# Patient Record
Sex: FEMALE | Race: WHITE | Employment: OTHER | URBAN - METROPOLITAN AREA
[De-identification: names, ages, dates, MRNs, and addresses within clinical notes are randomized per-mention and may not be internally consistent; named-entity substitution may affect disease eponyms.]

---

## 2017-04-03 RX ORDER — FAMOTIDINE 40 MG/1
TABLET, FILM COATED ORAL
Qty: 30 TABLET | Refills: 2 | Status: SHIPPED | OUTPATIENT
Start: 2017-04-03 | End: 2019-08-12

## 2017-08-28 ENCOUNTER — HOSPITAL ENCOUNTER (OUTPATIENT)
Dept: OTHER | Age: 72
Discharge: HOME | End: 2017-08-28
Attending: INTERNAL MEDICINE | Admitting: INTERNAL MEDICINE

## 2017-08-28 LAB
A/G RATIO: 1.6 (ref 1.1–2.2)
ALBUMIN SERPL-MCNC: 4.1 G/DL (ref 3.4–5)
ALP BLD-CCNC: 53 U/L (ref 40–129)
ALT SERPL-CCNC: 10 U/L (ref 10–40)
ANION GAP SERPL CALCULATED.3IONS-SCNC: 11 MMOL/L (ref 3–16)
AST SERPL-CCNC: 17 U/L (ref 15–37)
BASOPHILS ABSOLUTE: 0 K/UL (ref 0–0.2)
BASOPHILS RELATIVE PERCENT: 0.6 %
BILIRUB SERPL-MCNC: 0.8 MG/DL (ref 0–1)
BUN BLDV-MCNC: 12 MG/DL (ref 7–20)
CALCIUM SERPL-MCNC: 9.3 MG/DL (ref 8.3–10.6)
CHLORIDE BLD-SCNC: 103 MMOL/L (ref 99–110)
CHOLESTEROL, TOTAL: 195 MG/DL (ref 0–199)
CO2: 28 MMOL/L (ref 21–32)
CREAT SERPL-MCNC: 0.7 MG/DL (ref 0.6–1.2)
EOSINOPHILS ABSOLUTE: 0.1 K/UL (ref 0–0.6)
EOSINOPHILS RELATIVE PERCENT: 2 %
GFR AFRICAN AMERICAN: >60
GFR NON-AFRICAN AMERICAN: >60
GLOBULIN: 2.6 G/DL
GLUCOSE BLD-MCNC: 86 MG/DL (ref 70–99)
HCT VFR BLD CALC: 38.7 % (ref 36–48)
HDLC SERPL-MCNC: 71 MG/DL (ref 40–60)
HEMOGLOBIN: 12.9 G/DL (ref 12–16)
LDL CHOLESTEROL CALCULATED: 91 MG/DL
LYMPHOCYTES ABSOLUTE: 1.4 K/UL (ref 1–5.1)
LYMPHOCYTES RELATIVE PERCENT: 22.8 %
MCH RBC QN AUTO: 32.6 PG (ref 26–34)
MCHC RBC AUTO-ENTMCNC: 33.3 G/DL (ref 31–36)
MCV RBC AUTO: 98.1 FL (ref 80–100)
MONOCYTES ABSOLUTE: 0.6 K/UL (ref 0–1.3)
MONOCYTES RELATIVE PERCENT: 10.2 %
NEUTROPHILS ABSOLUTE: 3.9 K/UL (ref 1.7–7.7)
NEUTROPHILS RELATIVE PERCENT: 64.4 %
PDW BLD-RTO: 13.8 % (ref 12.4–15.4)
PLATELET # BLD: 340 K/UL (ref 135–450)
PMV BLD AUTO: 8.4 FL (ref 5–10.5)
POTASSIUM SERPL-SCNC: 4.6 MMOL/L (ref 3.5–5.1)
RBC # BLD: 3.95 M/UL (ref 4–5.2)
SODIUM BLD-SCNC: 142 MMOL/L (ref 136–145)
TOTAL PROTEIN: 6.7 G/DL (ref 6.4–8.2)
TRIGL SERPL-MCNC: 163 MG/DL (ref 0–150)
TSH REFLEX: 2.35 UIU/ML (ref 0.27–4.2)
VLDLC SERPL CALC-MCNC: 33 MG/DL
WBC # BLD: 6.1 K/UL (ref 4–11)

## 2018-04-07 ENCOUNTER — HOSPITAL ENCOUNTER (OUTPATIENT)
Dept: GENERAL RADIOLOGY | Age: 73
Discharge: HOME | End: 2018-04-07
Attending: INTERNAL MEDICINE | Admitting: INTERNAL MEDICINE

## 2018-04-07 DIAGNOSIS — R10.13 ABDOMINAL PAIN, EPIGASTRIC: ICD-10-CM

## 2018-04-07 DIAGNOSIS — R10.13 EPIGASTRIC PAIN: ICD-10-CM

## 2018-04-12 ENCOUNTER — HOSPITAL ENCOUNTER (OUTPATIENT)
Dept: OTHER | Age: 73
Discharge: HOME | End: 2018-04-12

## 2018-04-12 DIAGNOSIS — R05.9 COUGH: ICD-10-CM

## 2019-04-24 ENCOUNTER — HOSPITAL ENCOUNTER (OUTPATIENT)
Dept: GENERAL RADIOLOGY | Age: 74
Discharge: HOME | End: 2019-04-24
Payer: MEDICARE

## 2019-04-24 DIAGNOSIS — Z78.0 POSTMENOPAUSAL: ICD-10-CM

## 2019-04-24 PROCEDURE — 77080 DXA BONE DENSITY AXIAL: CPT

## 2019-05-23 ENCOUNTER — HOSPITAL ENCOUNTER (OUTPATIENT)
Dept: PHYSICAL THERAPY | Age: 74
Setting detail: THERAPIES SERIES
Discharge: HOME | End: 2019-05-23
Payer: MEDICARE

## 2019-05-23 PROCEDURE — 97530 THERAPEUTIC ACTIVITIES: CPT

## 2019-05-23 PROCEDURE — 97112 NEUROMUSCULAR REEDUCATION: CPT

## 2019-05-23 PROCEDURE — 97161 PT EVAL LOW COMPLEX 20 MIN: CPT

## 2019-05-23 PROCEDURE — 97110 THERAPEUTIC EXERCISES: CPT

## 2019-05-23 NOTE — PROGRESS NOTES
Physical Therapy  Initial Assessment  Date: 2019  Patient Name: Mayte Concepcion  MRN: [id number]  : 1945     Treatment Diagnosis: decreased pelvic floor and core strength    Restrictions    Outpatient fall risk assessment completed asking screening question if patient has fallen in the past 30 days:  [x] Yes  [] No    Based on screen for falls, patient demonstrates fall risk:  [] Yes  [x] No    Interventions based on fall risk status:  Updated Problem List within Medical History  [] Yes   [x] N/A    Asked family to assist with increased observation of the patient  [] Yes   [] N/A    Patient kept in visible area when not closely supervised by therapist  [] Yes   [x] N/A    Repeatedly reinforce activity limits and safety needs with patient/family  [] Yes   [x] N/A    Increase frequency of rounding/monitoring patient  [] Yes   [x] N/A        Subjective   General  Chart Reviewed: Yes  Diagnosis: incontinence/ pelvic floor weakness  Follows Commands: Within Functional Limits  General Comment  Comments: 6 children  PT Visit Information  PT Insurance Information: medicare- Aet  Total # of Visits Approved: 12  Subjective  Subjective: Pt reports she had accidents when in [facility]. Went to her MD and he reccommended core strengthening. Went to her GYN and he said pelvic floor therapy. States she doesn't have any issues with diarrahea anymore with dietary changes. Taking probiotics also. Has had issues with fully empying bladder and infections in past. PLOF:  I with all ADL's. Spend 4 months in [facility] in the winter.    Pain Screening  Patient Currently in Pain: Yes  Vital Signs  Patient Currently in Pain: Yes    Vision/Hearing  Vision  Vision: Within Functional Limits  Hearing  Hearing: Within functional limits    Orientation  Orientation  Overall Orientation Status: Within Normal Limits    Social/Functional History  Social/Functional History  Lives With: Spouse  Type of Home: House  Home Layout: Two level  ADL Assistance: Independent  Homemaking Assistance: Independent  Ambulation Assistance: Independent  Transfer Assistance: Independent  Active : Yes  Occupation: Retired  Type of occupation: clean houses  Leisure & Hobbies: children's activities. Objective     Observation/Palpation  Posture: Good    AROM RLE (degrees)  RLE AROM: WNL  AROM LLE (degrees)  LLE AROM : WNL    Strength RLE  Strength RLE: Exception  R Hip Flexion: 4+/5  R Hip Extension: 5/5  R Hip ABduction: 4/5  R Hip ADduction: 4/5  R Hip Internal Rotation: 4/5  R Hip External Rotation: 4/5  Strength LLE  Strength LLE: Exception  L Hip Flexion: 4+/5  L Hip Extension: 5/5  L Hip ABduction: 4/5  L Hip ADduction: 4/5  L Hip Internal Rotation: 4/5  L Hip External Rotation: 4/5  Additional Measures  Flexibility: L piriformis tightnes    Assessment   Conditions Requiring Skilled Therapeutic Intervention  Body structures, Functions, Activity limitations: Decreased functional mobility ; Decreased high-level IADLs;Decreased ADL status; Decreased endurance;Decreased strength  Assessment: Pt presents with decreased pelvic floor and core strength.   Pt will benefit from skilled physical therapy to return to PLOF  Treatment Diagnosis: decreased pelvic floor and core strength  Prognosis: Good  Decision Making: Low Complexity  REQUIRES PT FOLLOW UP: Yes  Activity Tolerance  Activity Tolerance: Patient Tolerated treatment well      Plan   Plan  Times per week: 2 x a week  Plan weeks: 6 weeks  Specific instructions for Next Treatment: biofeedbacck further assessment of pelvic floor mm  Current Treatment Recommendations: Strengthening, Manual Therapy - Joint Manipulation, Neuromuscular Re-education, Functional Mobility Training, Endurance Training, Manual Therapy - Soft Tissue Mobilization, Home Exercise Program      Goals  Long term goals  Time Frame for Long term goals : 6 weeks  Long term goal 1: Pt will be I in HEP to return to PLOF  Long term goal 2: Pt will

## 2019-05-23 NOTE — FLOWSHEET NOTE
Outpatient Physical Therapy     Phone: [phone] Fax: [phone]     To:        Patient: Lucy Saldana   : 1945   MRN: [id number]  Evaluation Date: 2019      Diagnosis Information:  · Diagnosis: incontinence/ pelvic floor weakness   · Treatment Diagnosis: decreased pelvic floor and core strength     Physical Therapy Certification/Re-Certification Form  Dear Mireille Burks,  The following patient has been evaluated for physical therapy services. Please review the attached evaluation and/or summary of the patient's plan of care, and verify that you agree therapy should continue by signing the attached document and sending it back to our office. Plan of Care/Treatment to date:  [x] Therapeutic Exercise      [] Modalities:  [x] Therapeutic Activity        [] Ultrasound    [] Gait Training        [] Cervical Traction   [x] Neuromuscular Re-education      [] Cold/hotpack    [x] Instruction in HEP        [] Lumbar Traction  [] Manual Therapy        [] Electrical Stimulation            [] Aquatic Therapy        [] Iontophoresis        ? [] Lymphedema management  [x] Women's Health     Other:  [] Vestibular Rehab        [x]  biofeedback  []  Needed     Frequency/Duration:  # Days per week: [] 1 day # Weeks: [] 1 week [] 5 weeks     [x] 2 days? [] 2 weeks [x] 6 weeks     [] 3 days   [] 3 weeks [] 7 weeks     [] 4 days   [] 4 weeks [] 8 weeks    Rehab Potential: [] Excellent [x] Good [] Fair  [] Poor     Electronically signed by:  Handy Adame PT    If you have any questions or concerns, please don't hesitate to call.   Thank you for your referral.      Physician Signature:________________________________Date:__________________  By signing above, therapists plan is approved by physician

## 2019-05-23 NOTE — FLOWSHEET NOTE
Physical Therapy Daily Treatment Note  Date:  2019    Patient Name:  Audi Lakhani    :  1945  MRN: [id number]  Restrictions/Precautions:    Medical/Treatment Diagnosis Information:   Diagnosis: incontinence/ pelvic floor weakness  Treatment Diagnosis: decreased pelvic floor and core strength    Tracking Information:  Physician Information Referring Practitioner: Dr. Thiago Rowland of Care Sent Date:  Signed Received:    Visit Count / Total Visits      Insurance Approved Visits  /  Approved Dates:     Insurance Information PT Insurance Information: medicare- [employer]     Progress Note/G-codes   [x]  Yes  []  No Next Due:      Pain level: 0/10     Subjective:  See eval    Objective:   Observation:   Test measurements:      Exercises:  Exercise/Equipment Resistance/Repetitions Other comments   On ball  3'/10' hold/relax x 10  Quick flicks x 10 every 5'                                                                        Other Therapeutic Activities: : pt taught pelvic floor anatomy, normal bladder behavior and bladder irritants    Home Exercise Program:  19 Patient was educated on home exercise program including distrubution of handout describing exercises, sets, repetitions, frequency and intensity.  Exercises/activities include: SLR in all dir x 10, clam shell x 10 hip circles x 10, pelvic floor contraction  With hold/relax and quick flicks     Manual Treatments:      Modalities:      Timed Code Treatment Minutes:  39    Total Treatment Minutes:  53    Treatment/Activity Tolerance:  [x] Patient tolerated treatment well [] Patient limited by fatigue  [] Patient limited by pain  [] Patient limited by other medical complications  [] Other:     Prognosis: [x] Good [] Fair  [] Poor    Patient Requires Follow-up: [x] Yes  [] No    Plan:   [] Continue per plan of care [] Alter current plan (see comments)  [x] Plan of care initiated [] Hold pending MD visit [] Discharge  Plan for Next Session:  Biofeedback, core/hip strengthening    Electronically signed by:  Negro Lara PT

## 2019-06-15 ENCOUNTER — HOSPITAL ENCOUNTER (EMERGENCY)
Age: 74
Discharge: HOME | End: 2019-06-15
Payer: MEDICARE

## 2019-06-15 ENCOUNTER — APPOINTMENT (OUTPATIENT)
Dept: CT IMAGING | Age: 74
End: 2019-06-15
Payer: MEDICARE

## 2019-06-15 VITALS
WEIGHT: 116 LBS | RESPIRATION RATE: 14 BRPM | SYSTOLIC BLOOD PRESSURE: 124 MMHG | BODY MASS INDEX: 19.91 KG/M2 | DIASTOLIC BLOOD PRESSURE: 63 MMHG | OXYGEN SATURATION: 98 % | TEMPERATURE: 98.8 F | HEART RATE: 78 BPM

## 2019-06-15 DIAGNOSIS — R33.8 ACUTE URINARY RETENTION: Primary | ICD-10-CM

## 2019-06-15 DIAGNOSIS — N30.01 ACUTE CYSTITIS WITH HEMATURIA: ICD-10-CM

## 2019-06-15 LAB
ANION GAP SERPL CALCULATED.3IONS-SCNC: 11 MMOL/L (ref 3–16)
BASOPHILS ABSOLUTE: 0 K/UL (ref 0–0.2)
BASOPHILS RELATIVE PERCENT: 0.2 %
BILIRUBIN URINE: NEGATIVE
BLOOD, URINE: ABNORMAL
BUN BLDV-MCNC: 19 MG/DL (ref 7–20)
CALCIUM SERPL-MCNC: 10.1 MG/DL (ref 8.3–10.6)
CHLORIDE BLD-SCNC: 97 MMOL/L (ref 99–110)
CLARITY: ABNORMAL
CO2: 26 MMOL/L (ref 21–32)
COLOR: ABNORMAL
CREAT SERPL-MCNC: 0.7 MG/DL (ref 0.6–1.2)
EOSINOPHILS ABSOLUTE: 0 K/UL (ref 0–0.6)
EOSINOPHILS RELATIVE PERCENT: 0.1 %
GFR AFRICAN AMERICAN: >60
GFR NON-AFRICAN AMERICAN: >60
GLUCOSE BLD-MCNC: 96 MG/DL (ref 70–99)
GLUCOSE URINE: NEGATIVE MG/DL
HCT VFR BLD CALC: 39.7 % (ref 36–48)
HEMOGLOBIN: 13.3 G/DL (ref 12–16)
KETONES, URINE: NEGATIVE MG/DL
LEUKOCYTE ESTERASE, URINE: ABNORMAL
LYMPHOCYTES ABSOLUTE: 0.9 K/UL (ref 1–5.1)
LYMPHOCYTES RELATIVE PERCENT: 6 %
MCH RBC QN AUTO: 32.4 PG (ref 26–34)
MCHC RBC AUTO-ENTMCNC: 33.5 G/DL (ref 31–36)
MCV RBC AUTO: 96.6 FL (ref 80–100)
MICROSCOPIC EXAMINATION: YES
MONOCYTES ABSOLUTE: 1.1 K/UL (ref 0–1.3)
MONOCYTES RELATIVE PERCENT: 7.4 %
NEUTROPHILS ABSOLUTE: 12.7 K/UL (ref 1.7–7.7)
NEUTROPHILS RELATIVE PERCENT: 86.3 %
NITRITE, URINE: NEGATIVE
PDW BLD-RTO: 13.4 % (ref 12.4–15.4)
PH UA: 6.5 (ref 5–8)
PLATELET # BLD: 306 K/UL (ref 135–450)
PMV BLD AUTO: 8.2 FL (ref 5–10.5)
POTASSIUM REFLEX MAGNESIUM: 4.1 MMOL/L (ref 3.5–5.1)
PROTEIN UA: >=300 MG/DL
RBC # BLD: 4.11 M/UL (ref 4–5.2)
RBC UA: NORMAL /HPF (ref 0–2)
SODIUM BLD-SCNC: 134 MMOL/L (ref 136–145)
SPECIFIC GRAVITY UA: 1.02 (ref 1–1.03)
URINE REFLEX TO CULTURE: YES
URINE TYPE: ABNORMAL
UROBILINOGEN, URINE: 0.2 E.U./DL
WBC # BLD: 14.7 K/UL (ref 4–11)
WBC UA: NORMAL /HPF (ref 0–5)

## 2019-06-15 PROCEDURE — 87086 URINE CULTURE/COLONY COUNT: CPT

## 2019-06-15 PROCEDURE — 81001 URINALYSIS AUTO W/SCOPE: CPT

## 2019-06-15 PROCEDURE — 74176 CT ABD & PELVIS W/O CONTRAST: CPT

## 2019-06-15 PROCEDURE — 85025 COMPLETE CBC W/AUTO DIFF WBC: CPT

## 2019-06-15 PROCEDURE — 99284 EMERGENCY DEPT VISIT MOD MDM: CPT

## 2019-06-15 PROCEDURE — 51798 US URINE CAPACITY MEASURE: CPT

## 2019-06-15 PROCEDURE — 2580000003 HC RX 258: Performed by: NURSE PRACTITIONER

## 2019-06-15 PROCEDURE — 6370000000 HC RX 637 (ALT 250 FOR IP): Performed by: NURSE PRACTITIONER

## 2019-06-15 PROCEDURE — 80048 BASIC METABOLIC PNL TOTAL CA: CPT

## 2019-06-15 PROCEDURE — 96360 HYDRATION IV INFUSION INIT: CPT

## 2019-06-15 PROCEDURE — 51701 INSERT BLADDER CATHETER: CPT

## 2019-06-15 RX ORDER — 0.9 % SODIUM CHLORIDE 0.9 %
1000 INTRAVENOUS SOLUTION INTRAVENOUS ONCE
Status: COMPLETED | OUTPATIENT
Start: 2019-06-15 | End: 2019-06-15

## 2019-06-15 RX ORDER — ONDANSETRON 4 MG/1
8 TABLET, ORALLY DISINTEGRATING ORAL ONCE
Status: COMPLETED | OUTPATIENT
Start: 2019-06-15 | End: 2019-06-15

## 2019-06-15 RX ORDER — CEFUROXIME AXETIL 250 MG/1
250 TABLET ORAL 2 TIMES DAILY
Qty: 14 TABLET | Refills: 0 | Status: SHIPPED | OUTPATIENT
Start: 2019-06-15 | End: 2019-06-22

## 2019-06-15 RX ADMIN — ONDANSETRON 8 MG: 4 TABLET, ORALLY DISINTEGRATING ORAL at 14:18

## 2019-06-15 RX ADMIN — SODIUM CHLORIDE 1000 ML: 9 INJECTION, SOLUTION INTRAVENOUS at 15:58

## 2019-06-15 ASSESSMENT — ENCOUNTER SYMPTOMS
CONSTIPATION: 0
ABDOMINAL PAIN: 0
NAUSEA: 1
DIARRHEA: 1
RHINORRHEA: 0
SORE THROAT: 0
BLOOD IN STOOL: 0
SHORTNESS OF BREATH: 0
VOMITING: 0

## 2019-06-15 ASSESSMENT — PAIN SCALES - GENERAL: PAINLEVEL_OUTOF10: 4

## 2019-06-15 ASSESSMENT — PAIN DESCRIPTION - PAIN TYPE: TYPE: ACUTE PAIN

## 2019-06-15 NOTE — ED PROVIDER NOTES
[facility]        Pt Name: Porfirio Danielle  MRN: [id number]  [place] 1945  Date of evaluation: 6/15/2019  Provider: DEAN Goode CNP  PCP: Larry Almeida MD    This patient was not seen and evaluated by the attending physician    [address] [place] [address]       Chief Complaint   Patient presents with    Urinary Retention     Pt to ER with urinary retention since this morning, states bilateral lowert back pain, states started yesterday but worse today. pt states was strainging this morning to urinate, causes herself to had episode of emesis. Pt states chills. states diarrhea before coming to ER. HISTORY OF PRESENT ILLNESS   (Location/Symptom, Timing/Onset,Context/Setting, Quality, Duration, Modifying Factors, Severity)  Note limiting factors. Porfirio Danielle is a 68 y.o. female who presents the ER with concern for acute urinary retention. She reports that she does see a urologist for this. She has not had any issues in over 8 months. She reports that she started having some pain with urination last night. This morning she is been unable to void. She had to catheterize herself for urine. She feels pressure like she needs to urinate but is unable to do so. She reports associated nausea without vomiting as well as diarrhea. She denies fever, rash, headaches, dizziness, chest pain, shortness of breath, cough, congestion, abdominal pain, vomiting, constipation, or blood in the stool. One friend/family member at bedside. Nursing Notes triage note reviewed and agreed with or any disagreements were addressed  in the HPI. REVIEW OF SYSTEMS    (2-9 systems for level 4, 10 or more for level 5)     Review of Systems   Constitutional: Negative for chills and fever. HENT: Negative for postnasal drip, rhinorrhea and sore throat. Eyes: Negative for visual disturbance.    Respiratory: Negative for shortness of breath. Cardiovascular: Negative for chest pain. Gastrointestinal: Positive for diarrhea and nausea. Negative for abdominal pain, blood in stool, constipation and vomiting. Genitourinary: Positive for difficulty urinating and dysuria. Negative for flank pain and hematuria. Skin: Negative for rash. Neurological: Negative for weakness and headaches. All other systems reviewed and are negative. Positives and Pertinent negatives as per HPI. Except as noted above in the ROS, all other systems were reviewed and negative. PAST MEDICAL HISTORY     Past Medical History:   Diagnosis Date    Anxiety     Depression     GERD (gastroesophageal reflux disease)     Pap smear 04/12/2011    Psychiatric problem     Sjogren syndrome     pt denies having this disease         SURGICAL HISTORY       Past Surgical History:   Procedure Laterality Date    BREAST ENHANCEMENT SURGERY      EYE SURGERY      mayank catarcts    LEEP      VARICOSE VEIN SURGERY Right 4/1/2016    lesser saphenous vein radiofrequency ablation;ligation & division of saphenous saphopopiliteal junction;stab phlebectomy    VASCULAR SURGERY Right 9.5.06    L/D of SFJ; RFA, L/D SPJ,Stab Phlebectomy         CURRENT MEDICATIONS       Previous Medications    ESCITALOPRAM OXALATE (LEXAPRO PO)    Take by mouth    FAMOTIDINE (PEPCID) 40 MG TABLET    TAKE ONE TABLET BY MOUTH EVERY EVENING    PANTOTHENIC ACID PO    Take by mouth    TRAZODONE HCL PO    Take by mouth         ALLERGIES     Patient has no known allergies.     FAMILY HISTORY       Family History   Problem Relation Age of Onset    Stroke Mother     Heart Disease Father           SOCIAL HISTORY       Social History     Socioeconomic History    Marital status: Single     Spouse name: None    Number of children: None    Years of education: None    Highest education level: None   Occupational History    None   Social Needs    Financial resource strain: None    Food insecurity: Worry: None     Inability: None    Transportation needs:     Medical: None     Non-medical: None   Tobacco Use    Smoking status: Never Smoker    Smokeless tobacco: Never Used   Substance and Sexual Activity    Alcohol use: Yes     Comment: occ    Drug use: No    Sexual activity: None   Lifestyle    Physical activity:     Days per week: None     Minutes per session: None    Stress: None   Relationships    Social connections:     Talks on phone: None     Gets together: None     Attends Judaism service: None     Active member of club or organization: None     Attends meetings of clubs or organizations: None     Relationship status: None    Intimate partner violence:     Fear of current or ex partner: None     Emotionally abused: None     Physically abused: None     Forced sexual activity: None   Other Topics Concern    None   Social History Narrative    None       SCREENINGS             PHYSICAL EXAM  (up to 7 for level 4, 8 or more for level 5)     ED Triage Vitals [06/15/19 1333]   BP Temp Temp Source Pulse Resp SpO2 Height Weight   (!) 144/58 98.8 °F (37.1 °C) Oral 81 18 100 % -- 116 lb (52.6 kg)       Physical Exam   Constitutional: She is oriented to person, place, and time. She appears well-developed and well-nourished. No distress. HENT:   Head: Normocephalic and atraumatic. Eyes: Right eye exhibits no discharge. Left eye exhibits no discharge. No scleral icterus. Neck: Normal range of motion. Neck supple. Cardiovascular: Normal rate, regular rhythm, normal heart sounds and intact distal pulses. Exam reveals no gallop and no friction rub. No murmur heard. Pulmonary/Chest: Effort normal and breath sounds normal. No stridor. No respiratory distress. She has no wheezes. She has no rales. She exhibits no tenderness. Abdominal: Soft. Bowel sounds are normal. She exhibits no distension and no mass. There is no tenderness. There is no rebound and no guarding.    Musculoskeletal: Normal range of motion. She exhibits no edema or tenderness. Neurological: She is alert and oriented to person, place, and time. Coordination normal.   Skin: Skin is warm and dry. She is not diaphoretic. No pallor. Psychiatric: She has a normal mood and affect. Her behavior is normal.   Nursing note and vitals reviewed. DIAGNOSTIC RESULTS   LABS:    Labs Reviewed   URINE RT REFLEX TO CULTURE - Abnormal; Notable for the following components:       Result Value    Color, UA RED (*)     Clarity, UA TURBID (*)     Blood, Urine LARGE (*)     Protein, UA >=300 (*)     Leukocyte Esterase, Urine SMALL (*)     All other components within normal limits    Narrative:     Performed at:  [facility] [address],  [place]   Phone [phone]   BASIC METABOLIC PANEL W/ REFLEX TO MG FOR LOW K - Abnormal; Notable for the following components:    Sodium 134 (*)     Chloride 97 (*)     All other components within normal limits    Narrative:     Performed at:  [facility] [address] [place]   Phone [phone]   CBC WITH AUTO DIFFERENTIAL - Abnormal; Notable for the following components:    WBC 14.7 (*)     Neutrophils # 12.7 (*)     Lymphocytes # 0.9 (*)     All other components within normal limits    Narrative:     Performed at:  [facility] [address]   Phone [phone]   URINE CULTURE   MICROSCOPIC URINALYSIS    Narrative:     Performed at:  [facility] [address],  [place]   Phone [phone]       All other labs werewithin normal range or not returned as of this dictation. EKG: All EKG's are interpreted by the Emergency Department Physician who either signs or Co-signs this chart in the absence of acardiologist.  Please see their note for interpretation of EKG.       RADIOLOGY:   Interpretation per the

## 2019-06-15 NOTE — ED NOTES
Pt. Bladder scanned 4 times, best volume reading 143 ml. MOHINDER Hyde CNP informed, V.O. To straight cath.      Nita Anderson RN  06/15/19 [id number]

## 2019-06-17 LAB — URINE CULTURE, ROUTINE: NORMAL

## 2019-07-10 ENCOUNTER — OFFICE VISIT (OUTPATIENT)
Dept: VASCULAR SURGERY | Age: 74
End: 2019-07-10
Payer: MEDICARE

## 2019-07-10 VITALS
BODY MASS INDEX: 20.32 KG/M2 | DIASTOLIC BLOOD PRESSURE: 72 MMHG | HEART RATE: 66 BPM | HEIGHT: 64 IN | WEIGHT: 119 LBS | SYSTOLIC BLOOD PRESSURE: 122 MMHG

## 2019-07-10 DIAGNOSIS — I83.891 SYMPTOMATIC VARICOSE VEINS OF RIGHT LOWER EXTREMITY: ICD-10-CM

## 2019-07-10 DIAGNOSIS — M79.604 PAIN OF RIGHT LOWER EXTREMITY: Primary | ICD-10-CM

## 2019-07-10 PROCEDURE — 99214 OFFICE O/P EST MOD 30 MIN: CPT | Performed by: SURGERY

## 2019-07-10 RX ORDER — OMEPRAZOLE 20 MG/1
20 CAPSULE, DELAYED RELEASE ORAL DAILY
COMMUNITY

## 2019-07-10 ASSESSMENT — ENCOUNTER SYMPTOMS
RESPIRATORY NEGATIVE: 1
VOICE CHANGE: 1
SINUS PRESSURE: 1
SINUS PAIN: 1
DIARRHEA: 1

## 2019-08-12 ENCOUNTER — OFFICE VISIT (OUTPATIENT)
Dept: DERMATOLOGY | Age: 74
End: 2019-08-12
Payer: MEDICARE

## 2019-08-12 DIAGNOSIS — L72.0 MILIA: ICD-10-CM

## 2019-08-12 DIAGNOSIS — D22.9 MULTIPLE NEVI: ICD-10-CM

## 2019-08-12 DIAGNOSIS — D23.9 SYRINGOMA: ICD-10-CM

## 2019-08-12 DIAGNOSIS — L57.0 AK (ACTINIC KERATOSIS): ICD-10-CM

## 2019-08-12 DIAGNOSIS — B07.8 OTHER VIRAL WARTS: ICD-10-CM

## 2019-08-12 DIAGNOSIS — Z85.828 HISTORY OF NONMELANOMA SKIN CANCER: Primary | ICD-10-CM

## 2019-08-12 DIAGNOSIS — L60.8 NAIL DISCOLORATION: ICD-10-CM

## 2019-08-12 DIAGNOSIS — D48.5 NEOPLASM OF UNCERTAIN BEHAVIOR OF SKIN: ICD-10-CM

## 2019-08-12 PROCEDURE — 17110 DESTRUCTION B9 LES UP TO 14: CPT | Performed by: DERMATOLOGY

## 2019-08-12 PROCEDURE — 11102 TANGNTL BX SKIN SINGLE LES: CPT | Performed by: DERMATOLOGY

## 2019-08-12 PROCEDURE — 99214 OFFICE O/P EST MOD 30 MIN: CPT | Performed by: DERMATOLOGY

## 2019-08-12 PROCEDURE — 17000 DESTRUCT PREMALG LESION: CPT | Performed by: DERMATOLOGY

## 2019-08-12 NOTE — PROGRESS NOTES
[facility]  Oval MD Vicky  [phone]      Melodie Nunez  1945    76 y.o. female     Date of Visit: 8/12/2019    Last seen: 9-2016    Chief Complaint: skin cancer, moles  Chief Complaint   Patient presents with    Skin Lesion     FSE   - mole check       HX:     History of Present Illness:    1. Here for skin cancer. 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 Luigi Feliz. No probs with this site. 2. S/p bx of a lesion under the L eye in 2016 - read as syringoma - no probs since. 3. Here for evaluation of multiple asx pigmented lesions on the trunk and extremities, present for many years; no change in size/shape/color of any lesions; no bleeding lesions. 4. She has multiple asx papules on the cheeks. These are asx but she might like cosmetic treatment for them. 5. She has a rough lesion on the R hand. Asx.     6. She has a persistent wart on the R index. Occasionally tender. 7. She has a concerning rough lesion on the upper back x several mos. 8. C/o yellow-orange discoloration of fingernails x 1-2 years. Asx. Polishes intermittently but not for the past several mos. Hx of NMSC - SCC on the R calf ~ 2013 by Luigi Feliz. She has had botox/ the past but Dr. Marla Melvin office. Review of Systems:  Gen: Feels well, good sense of health. No F/C. Skin: No changing moles or lesions. No new rashes. Past Medical History, Family History, Surgical History, Medications and Allergies reviewed.     Outpatient Medications Marked as Taking for the 8/12/19 encounter (Office Visit) with Chery Roman MD   Medication Sig Dispense Refill    omeprazole (PRILOSEC) 20 MG delayed release capsule Take 20 mg by mouth daily      Escitalopram Oxalate (LEXAPRO PO) Take by mouth      TRAZODONE HCL PO Take by mouth       Allergies   Allergen Reactions    Seasonal        Past Medical History:   Diagnosis Date    Anxiety

## 2019-08-15 LAB — DERMATOLOGY PATHOLOGY REPORT: NORMAL

## 2019-09-03 ENCOUNTER — OFFICE VISIT (OUTPATIENT)
Dept: DERMATOLOGY | Age: 74
End: 2019-09-03
Payer: MEDICARE

## 2019-09-03 DIAGNOSIS — B07.8 OTHER VIRAL WARTS: ICD-10-CM

## 2019-09-03 DIAGNOSIS — L57.0 AK (ACTINIC KERATOSIS): Primary | ICD-10-CM

## 2019-09-03 DIAGNOSIS — L82.1 SEBORRHEIC KERATOSIS: ICD-10-CM

## 2019-09-03 PROCEDURE — 17110 DESTRUCTION B9 LES UP TO 14: CPT | Performed by: DERMATOLOGY

## 2019-09-03 PROCEDURE — 17000 DESTRUCT PREMALG LESION: CPT | Performed by: DERMATOLOGY

## 2019-09-03 PROCEDURE — 99212 OFFICE O/P EST SF 10 MIN: CPT | Performed by: DERMATOLOGY

## 2019-09-03 NOTE — PROGRESS NOTES
[facility]  Crystal Ron MD  [phone]      [place]  1945    76 y.o. female     Date of Visit: 9/3/2019    Last seen: 8-2019    Chief Complaint: AK  Chief Complaint   Patient presents with    Follow-up     prev bx on back-AK curettage vs LN2     History of Present Illness:    1. Here for trx s/p bx of lesion on the upper back - read as hypertrophic actinic keratosis, diffusely transected at   deep margin. No probs since last seen. 2. She has a papule on the L upper arm that she noticed after her last visit. Asx.     3. She has a wart on the R index, s/p LN2 at last visit - smaller but still present. Had FSE at last visit 8-2019. 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 Cameron Mei. S/p bx of a lesion under the L eye in 2016 - read as syringoma - no probs since. She has had botox/ the past but Dr. Jon Birmingham office. Review of Systems:  Gen: Feels well, good sense of health. Past Medical History, Family History, Surgical History, Medications and Allergies reviewed.     Outpatient Medications Marked as Taking for the 9/3/19 encounter (Office Visit) with Elvie Roberto MD   Medication Sig Dispense Refill    tretinoin (RETIN-A) 0.025 % cream Apply a pea sized amount to the face QHS 20 g 4    omeprazole (PRILOSEC) 20 MG delayed release capsule Take 20 mg by mouth daily      PANTOTHENIC ACID PO Take by mouth      Escitalopram Oxalate (LEXAPRO PO) Take by mouth      TRAZODONE HCL PO Take by mouth       Allergies   Allergen Reactions    Seasonal        Past Medical History:   Diagnosis Date    Anxiety     Depression     GERD (gastroesophageal reflux disease)     Pap smear 04/12/2011    Psychiatric problem     Sjogren syndrome     pt denies having this disease     Past Surgical History:   Procedure Laterality Date    BREAST ENHANCEMENT SURGERY      EYE SURGERY      mayank catarcts    LEEP      VARICOSE VEIN SURGERY Right 4/1/2016    lesser saphenous vein radiofrequency ablation;ligation & division of saphenous saphopopiliteal junction;stab phlebectomy    VASCULAR SURGERY Right 9.5.06    L/D of SFJ; RFA, L/D SPJ,Stab Phlebectomy       Physical Examination     Gen, well-appearing  The following were examined and determined to be normal: Psych/Neuro, LUE    The following were examined and determined to be abnormal: back, finger. R index with hyperkeratotic 3 mm papule  R upper back at neck with erythematous roughly scaled macule   L upper outer arm with stuck-on dull-surfaced tan papule             Assessment and Plan     1. HAK - R upper back at neck  - lesion(s) treated with liquid nitrogen x 2 cycles. Patient educated on risk of blister, hypopigmentation/scar and wound care. 2. SK - L upper arm - reassured  - treated with LN2 - no extra charge    3. Wart - R index  - lesion(s) treated with liquid nitrogen x 2 cycles. Patient educated on risk of blister, hypopigmentation/scar and wound care.

## 2021-10-21 ENCOUNTER — OFFICE VISIT (OUTPATIENT)
Dept: DERMATOLOGY | Age: 76
End: 2021-10-21
Payer: MEDICARE

## 2021-10-21 VITALS — TEMPERATURE: 98.6 F

## 2021-10-21 DIAGNOSIS — Z85.828 HISTORY OF NONMELANOMA SKIN CANCER: Primary | ICD-10-CM

## 2021-10-21 DIAGNOSIS — L72.0 MILIA: ICD-10-CM

## 2021-10-21 DIAGNOSIS — D22.9 MULTIPLE NEVI: ICD-10-CM

## 2021-10-21 DIAGNOSIS — L72.0 EPIDERMOID CYST: ICD-10-CM

## 2021-10-21 DIAGNOSIS — L57.0 AK (ACTINIC KERATOSIS): ICD-10-CM

## 2021-10-21 DIAGNOSIS — B07.9 VERRUCA VULGARIS: ICD-10-CM

## 2021-10-21 DIAGNOSIS — D23.9 SYRINGOMA: ICD-10-CM

## 2021-10-21 PROCEDURE — 99213 OFFICE O/P EST LOW 20 MIN: CPT | Performed by: DERMATOLOGY

## 2021-10-21 PROCEDURE — 17000 DESTRUCT PREMALG LESION: CPT | Performed by: DERMATOLOGY

## 2021-10-21 PROCEDURE — 17003 DESTRUCT PREMALG LES 2-14: CPT | Performed by: DERMATOLOGY

## 2021-10-21 RX ORDER — TRETINOIN 0.5 MG/G
CREAM TOPICAL
Qty: 20 G | Refills: 4 | Status: SHIPPED | OUTPATIENT
Start: 2021-10-21

## 2021-10-21 NOTE — PATIENT INSTRUCTIONS
[address] [place] [address]      Protecting Yourself From the [place]    · Apply an over-the-counter broad spectrum water resistant sunscreen with an SPF of at least 30 to exposed areas of the skin. Dont forget the ears and lips! Remember to reapply sunscreen about every 2 hours and after swimming or sweating. · Wear sun protective clothing. Swim shirts (aka. rash guards) are a great idea and negates the need to reapply sunscreen in those areas. · Seek the shade whenever possible especially between the hours of 10 am and 4 pm when the suns rays are the strongest.     · Avoid tanning beds      Cryosurgery (Freezing) Wound Care Instructions    AFTER THE PROCEDURE:    You will notice swelling and redness around the site. This is normal.    You may experience a sharp or sore feeling for the next several days. For this discomfort, you may take acetaminophen (Tylenol©).  A blister may develop at the treated area, sometimes as soon as by the end of the day. After several days, the blister will subside and a scab will form.  If the area is bumped or traumatized during the first few days following freezing, you may develop bleeding into the blister, forming a blood blister. This is nothing to be alarmed about.  If the blister is tense, uncomfortable, or much larger than the site that was frozen, you may pop the blister along its edge with a sterile needle (boiled, heated under a flame, or cleaned with alcohol) to allow the fluid to drain out. If the blister does not bother you, no treatment is needed.  Do NOT peel off the top of the blister roof. It will act as a dressing on top of your wound. WOUND CARE:    You may shower or bathe as usual, but avoid scrubbing the areas that have been frozen.  Cleanse the site twice a day with mild soapy water, and then apply a thin film of white petrolatum (Vaseline©).  You do not need to cover the area, but can if you prefer.     Do NOT allow the site to become dry or crusted, or attempt to dry it out with rubbing alcohol or hydrogen peroxide.  Continue this regimen until the area is pink and healed. Depending on the size and location of your cryosurgery site, healing may take 2 to 4 weeks.  The area may continue to be pink for several weeks, and over the next few months may become darker or lighter than the surrounding skin. This may be a permanent change.    

## 2021-10-21 NOTE — PROGRESS NOTES
[facility]  Sravani Nazario MD  [phone]      Mary Lewis  1945    68 y.o. female     Date of Visit: 10/21/2021    Last seen: 8-2019 and 9-2019    Chief Complaint: skin cancer, moles  Chief Complaint   Patient presents with    Skin Exam     History of Present Illness:    1. Here for skin cancer. 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 Bj Smith. No probs with this site. 2. S/p bx of a lesion under the L eye in 2016 - read as syringoma - no probs since. 3. Here for evaluation of multiple asx pigmented lesions on the trunk and extremities, present for many years; no change in size/shape/color of any lesions; no bleeding lesions. 4. She has multiple asx papules on the cheeks. These are asx but she might like cosmetic treatment for them. 5. She has a rough lesion on the L chest and posterior neck. HAK - R upper back at neck bx'd in the past - LN2 after and remains clear    6. She has a persistent wart on the R index desite LN2 in the past.      7. She has a cystic papule on the R lower posterior neck. Asx. Hx of NMSC - SCC on the R calf ~ 2013 by Bj Smith. She has had botox/ the past but Dr. Yo Loving office. Review of Systems:  Gen: Feels well, good sense of health. No F/C. Skin: No changing moles or lesions. No new rashes. Past Medical History, Family History, Surgical History, Medications and Allergies reviewed.     Outpatient Medications Marked as Taking for the 10/21/21 encounter (Office Visit) with Luiz Riedel, MD   Medication Sig Dispense Refill    tretinoin (RETIN-A) 0.025 % cream Apply a pea sized amount to the face QHS 20 g 4    omeprazole (PRILOSEC) 20 MG delayed release capsule Take 20 mg by mouth daily      PANTOTHENIC ACID PO Take by mouth      Escitalopram Oxalate (LEXAPRO PO) Take by mouth      TRAZODONE HCL PO Take by mouth       Allergies   Allergen Reactions   

## 2023-07-03 ENCOUNTER — TELEPHONE (OUTPATIENT)
Dept: CARDIOLOGY CLINIC | Age: 78
End: 2023-07-03

## 2023-07-03 NOTE — TELEPHONE ENCOUNTER
I spoke w/ Reina Springer. Scheduled appt 7/12/23 at 930am at [facility] location. Patient is agreeable to this date/time.

## 2023-07-03 NOTE — TELEPHONE ENCOUNTER
Pt is unable to come in 7/7/23 she has a schedule conflict. Please advise of another date except for 7/19/23.

## 2023-07-12 ENCOUNTER — OFFICE VISIT (OUTPATIENT)
Dept: CARDIOLOGY CLINIC | Age: 78
End: 2023-07-12
Payer: MEDICARE

## 2023-07-12 VITALS
OXYGEN SATURATION: 97 % | SYSTOLIC BLOOD PRESSURE: 126 MMHG | HEART RATE: 63 BPM | HEIGHT: 64 IN | DIASTOLIC BLOOD PRESSURE: 66 MMHG | BODY MASS INDEX: 21.17 KG/M2 | WEIGHT: 124 LBS

## 2023-07-12 DIAGNOSIS — E78.49 OTHER HYPERLIPIDEMIA: ICD-10-CM

## 2023-07-12 DIAGNOSIS — R00.2 PALPITATIONS: Primary | ICD-10-CM

## 2023-07-12 PROCEDURE — 99203 OFFICE O/P NEW LOW 30 MIN: CPT | Performed by: INTERNAL MEDICINE

## 2023-07-12 PROCEDURE — 1123F ACP DISCUSS/DSCN MKR DOCD: CPT | Performed by: INTERNAL MEDICINE

## 2023-07-12 RX ORDER — ALBUTEROL SULFATE 90 UG/1
2 AEROSOL, METERED RESPIRATORY (INHALATION) EVERY 6 HOURS PRN
COMMUNITY
Start: 2023-06-30

## 2023-07-12 RX ORDER — ESCITALOPRAM OXALATE 20 MG/1
TABLET ORAL
COMMUNITY
Start: 2023-05-07

## 2023-07-12 RX ORDER — CHLORAL HYDRATE 500 MG
CAPSULE ORAL DAILY
COMMUNITY

## 2023-07-12 RX ORDER — ASCORBIC ACID 250 MG
250 TABLET ORAL DAILY
COMMUNITY

## 2023-07-12 RX ORDER — ESTRADIOL 0.1 MG/G
CREAM VAGINAL
COMMUNITY
Start: 2020-12-14

## 2023-07-12 RX ORDER — PANTOPRAZOLE SODIUM 40 MG/1
TABLET, DELAYED RELEASE ORAL
COMMUNITY
Start: 2023-04-27

## 2023-07-12 RX ORDER — ATORVASTATIN CALCIUM 20 MG/1
20 TABLET, FILM COATED ORAL DAILY
Qty: 90 TABLET | Refills: 1 | Status: SHIPPED | OUTPATIENT
Start: 2023-07-12

## 2023-07-20 ENCOUNTER — TELEPHONE (OUTPATIENT)
Dept: DERMATOLOGY | Age: 78
End: 2023-07-20

## 2023-07-20 NOTE — TELEPHONE ENCOUNTER
LOV 2021    Notified patient with VI peel and price of 250.00. Patient will do research and call back to schedule if she decides to do the treatment.

## 2023-07-20 NOTE — TELEPHONE ENCOUNTER
Pt is wondering what types of chemical peels Dr. William Zuleta does and what the approximate cost is for the procedure?     Phone [phone]

## 2023-12-11 ENCOUNTER — TELEPHONE (OUTPATIENT)
Dept: DERMATOLOGY | Age: 78
End: 2023-12-11

## 2023-12-11 NOTE — TELEPHONE ENCOUNTER
Pt states has several scabby sore areas of concern on her back. Would like appt sooner than first available.     [phone]

## 2023-12-22 ENCOUNTER — TELEPHONE (OUTPATIENT)
Dept: DERMATOLOGY | Age: 78
End: 2023-12-22

## 2024-01-15 RX ORDER — ATORVASTATIN CALCIUM 20 MG/1
20 TABLET, FILM COATED ORAL DAILY
Qty: 90 TABLET | Refills: 3 | Status: SHIPPED | OUTPATIENT
Start: 2024-01-15

## 2024-01-15 NOTE — TELEPHONE ENCOUNTER
Sadaf called, she needs a refill for Lipitor to go to [facility] in [facility]. Rx pending

## 2024-06-18 ENCOUNTER — TELEPHONE (OUTPATIENT)
Dept: DERMATOLOGY | Age: 79
End: 2024-06-18

## 2024-06-18 NOTE — TELEPHONE ENCOUNTER
Pt called after leaving [facility] canceling today's appt due to  being in hospital. Would like to be on a wait list for a sooner appt than I can schedule but at least a week from now to give  a chance to stabilize.    [phone]

## 2024-07-22 ENCOUNTER — TELEPHONE (OUTPATIENT)
Dept: DERMATOLOGY | Age: 79
End: 2024-07-22

## 2024-09-03 ENCOUNTER — OFFICE VISIT (OUTPATIENT)
Dept: DERMATOLOGY | Age: 79
End: 2024-09-03
Payer: MEDICARE

## 2024-09-03 DIAGNOSIS — L82.1 SEBORRHEIC KERATOSIS: ICD-10-CM

## 2024-09-03 DIAGNOSIS — D04.5 SQUAMOUS CELL CARCINOMA IN SITU (SCCIS) OF SKIN OF BACK: Primary | ICD-10-CM

## 2024-09-03 PROCEDURE — 99212 OFFICE O/P EST SF 10 MIN: CPT | Performed by: DERMATOLOGY

## 2024-09-03 PROCEDURE — 17262 DSTRJ MAL LES T/A/L 1.1-2.0: CPT | Performed by: DERMATOLOGY

## 2024-09-03 PROCEDURE — 1123F ACP DISCUSS/DSCN MKR DOCD: CPT | Performed by: DERMATOLOGY

## 2024-09-03 NOTE — PROGRESS NOTES
[facility]  Yanet Kauffman MD  [phone]      Sadaf Daly  1945    79 y.o. 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    She notes a dry spot on the central upper back. Asx.     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 Yisel Beach.  Overdue for FSE.    She may see Dr. Aguila.  She has had botox/ the past but Dr. Jelena Todd's office.    Review of Systems:  Gen: Feels well, good sense of health.     Past Medical History, Family History, Surgical History, Medications and Allergies reviewed.    Outpatient Medications Marked as Taking for the 9/3/24 encounter (Office Visit) with Yanet Kauffman MD   Medication Sig Dispense Refill    atorvastatin (LIPITOR) 20 MG tablet Take 1 tablet by mouth daily 90 tablet 3    escitalopram (LEXAPRO) 20 MG tablet       pantoprazole (PROTONIX) 40 MG tablet       Cholecalciferol 75 MCG (3000 UT) TABS Take 1,000 mcg by mouth 2 times daily      estradiol (ESTRACE) 0.1 MG/GM vaginal cream 0.5 g to vagina at night time for 2 weeks, then 2-3 times weekly      ascorbic acid (VITAMIN C) 250 MG tablet Take 1 tablet by mouth daily      Omega-3 Fatty Acids (FISH OIL) 1000 MG capsule Take by mouth daily      tretinoin (RETIN-A) 0.05 % cream Apply a pea sized amount to the face QHS 20 g 4     Allergies   Allergen Reactions    Seasonal        Past Medical History:   Diagnosis Date    Anxiety     Depression     GERD (gastroesophageal reflux disease)     Pap smear 04/12/2011    Psychiatric problem     Sjogren syndrome     pt denies having this disease     Past Surgical History:   Procedure Laterality Date    BREAST ENHANCEMENT SURGERY      EYE SURGERY      mayank catarcts    LEEP      VARICOSE VEIN SURGERY Right 4/1/2016    lesser

## 2024-09-03 NOTE — PATIENT INSTRUCTIONS

## 2025-06-25 ENCOUNTER — TELEPHONE (OUTPATIENT)
Age: 80
End: 2025-06-25

## 2025-06-25 NOTE — TELEPHONE ENCOUNTER
Pt called has spots coming up on back feels like scabs and they also feel lumpy. This is been going on for a year. No itchy.    [phone]

## 2025-07-03 ENCOUNTER — OFFICE VISIT (OUTPATIENT)
Age: 80
End: 2025-07-03
Payer: MEDICARE

## 2025-07-03 DIAGNOSIS — L81.4 LENTIGINES: ICD-10-CM

## 2025-07-03 DIAGNOSIS — L57.0 AK (ACTINIC KERATOSIS): Primary | ICD-10-CM

## 2025-07-03 DIAGNOSIS — L72.0 EPIDERMOID CYST: ICD-10-CM

## 2025-07-03 PROCEDURE — 17000 DESTRUCT PREMALG LESION: CPT | Performed by: DERMATOLOGY

## 2025-07-03 PROCEDURE — 1123F ACP DISCUSS/DSCN MKR DOCD: CPT | Performed by: DERMATOLOGY

## 2025-07-03 PROCEDURE — 1160F RVW MEDS BY RX/DR IN RCRD: CPT | Performed by: DERMATOLOGY

## 2025-07-03 PROCEDURE — 17003 DESTRUCT PREMALG LES 2-14: CPT | Performed by: DERMATOLOGY

## 2025-07-03 PROCEDURE — 99213 OFFICE O/P EST LOW 20 MIN: CPT | Performed by: DERMATOLOGY

## 2025-07-03 PROCEDURE — 1159F MED LIST DOCD IN RCRD: CPT | Performed by: DERMATOLOGY

## 2025-07-03 NOTE — PROGRESS NOTES
[facility]  Yanet Kauffman MD  [phone]      Sadaf Daly  1945    80 y.o. female     Date of Visit: 7/3/2025    Last seen:     Chief Complaint: lesions  Chief Complaint   Patient presents with    Skin Lesion     Upper back     History of Present Illness:    History of Present Illness  The patient, an 80-year-old female, presents with lesions on her back.    Lesions on back  - Reports the presence of a few rough lesions on her back  - Acknowledges scratching at them    She also notes a few other papules on the upper back.  Asx.  Has tried to squeeze them.    She has tan macules on the face - one on the L [place] area/temple present for many years.        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 Yisel Beach.  Overdue for FSE.    She may see Dr. Aguila.  She has had botox/ the past but Dr. Jelena Todd's office.    Review of Systems:  Gen: Feels well, good sense of health.     Past Medical History, Family History, Surgical History, Medications and Allergies reviewed.    Outpatient Medications Marked as Taking for the 7/3/25 encounter (Office Visit) with Yanet Kauffman MD   Medication Sig Dispense Refill    atorvastatin (LIPITOR) 20 MG tablet Take 1 tablet by mouth daily 90 tablet 3    escitalopram (LEXAPRO) 20 MG tablet       pantoprazole (PROTONIX) 40 MG tablet       Cholecalciferol 75 MCG (3000 UT) TABS Take 1,000 mcg by mouth 2 times daily      estradiol (ESTRACE) 0.1 MG/GM vaginal cream 0.5 g to vagina at night time for 2 weeks, then 2-3 times weekly      ascorbic acid (VITAMIN C) 250 MG tablet Take 1 tablet by mouth daily      Omega-3 Fatty Acids (FISH OIL) 1000 MG capsule Take by mouth daily      tretinoin (RETIN-A) 0.05 % cream Apply a pea sized amount to the face QHS 20 g 4     Allergies   Allergen Reactions    Environmental/Seasonal        Past Medical History: